# Patient Record
Sex: MALE | Race: WHITE | Employment: UNEMPLOYED | URBAN - NONMETROPOLITAN AREA
[De-identification: names, ages, dates, MRNs, and addresses within clinical notes are randomized per-mention and may not be internally consistent; named-entity substitution may affect disease eponyms.]

---

## 2022-09-09 ENCOUNTER — HOSPITAL ENCOUNTER (EMERGENCY)
Age: 22
Discharge: HOME | End: 2022-09-09
Attending: EMERGENCY MEDICINE
Payer: COMMERCIAL

## 2022-09-09 VITALS
TEMPERATURE: 98.3 F | DIASTOLIC BLOOD PRESSURE: 86 MMHG | OXYGEN SATURATION: 100 % | HEIGHT: 66 IN | BODY MASS INDEX: 20.89 KG/M2 | RESPIRATION RATE: 16 BRPM | SYSTOLIC BLOOD PRESSURE: 144 MMHG | HEART RATE: 98 BPM | WEIGHT: 130 LBS

## 2022-09-09 DIAGNOSIS — R04.0 EPISTAXIS: Primary | ICD-10-CM

## 2022-09-09 PROCEDURE — 99282 EMERGENCY DEPT VISIT SF MDM: CPT

## 2022-09-09 ASSESSMENT — PAIN SCALES - GENERAL: PAINLEVEL_OUTOF10: 0

## 2022-09-09 ASSESSMENT — PAIN - FUNCTIONAL ASSESSMENT: PAIN_FUNCTIONAL_ASSESSMENT: 0-10

## 2022-09-09 NOTE — ED NOTES
Pt AVS and follow up reviewed. Pt expressed understanding and d/c at this time.       Joe Mac RN  09/09/22 8086

## 2022-09-09 NOTE — ED PROVIDER NOTES
CHIEF COMPLAINT  Foreign Body in Nose (Pt states he stuck tissue into his L nostril yesterday for a bleeding nose, pt states he fell asleep and then woke up today and couldn't find the tissue. Pt reports pressure in his nostril. )      HISTORY OF PRESENT ILLNESS  Michael Patricia  is a 23 y.o. male who presents to the ED at via private vehicle complaining of possible foreign body in the left nare. Patient states he had epistaxis yesterday evening so he packed his left nose with tissue paper. When he woke up this morning he was no longer in his nose and he did not notice it around his pillow or bed so he assumed he must have possibly inhaled it during the night. Sent in by his rehab facility for further evaluation although he notes the nurse at the facility did not see anything in his nose. He has no complaints. There are no other complaints, modifying factors or associated symptoms. Nursing notes reviewed. Past medical history:  has no past medical history on file. Past surgical history:  has a past surgical history that includes Tonsillectomy. Home medications:   Prior to Admission medications    Not on File       No Known Allergies    Social history:  reports that he has been smoking cigarettes. He has a 1.00 pack-year smoking history. He has never used smokeless tobacco. He reports that he does not currently use alcohol. He reports current drug use. Drugs: IV, Marijuana (Sylwia Neighbor), Methamphetamines (Crystal Meth), and Opiates . Family history:  History reviewed. No pertinent family history. REVIEW OF SYSTEMS  6 systems reviewed, pertinent positives per HPI otherwise noted to be negative    PHYSICAL EXAM  Vitals:    09/09/22 1314   BP: (!) 144/86   Pulse: 98   Resp: 16   Temp: 98.3 °F (36.8 °C)   SpO2: 100%       GENERAL: Patient is well-developed, well-nourished,  no acute distress. no apparent discomfort. Non toxic appearing. HEENT:  Normocephalic, atraumatic. PERRL.   Conjunctiva 16   Ht 5' 6\" (1.676 m)   Wt 130 lb (59 kg)   SpO2 100%   BMI 20.98 kg/m²                    Emma Dubois MD  09/09/22 6682

## 2022-10-12 ENCOUNTER — HOSPITAL ENCOUNTER (EMERGENCY)
Age: 22
Discharge: HOME | End: 2022-10-12
Attending: EMERGENCY MEDICINE
Payer: MEDICAID

## 2022-10-12 VITALS
RESPIRATION RATE: 16 BRPM | TEMPERATURE: 98.5 F | HEIGHT: 67 IN | SYSTOLIC BLOOD PRESSURE: 130 MMHG | DIASTOLIC BLOOD PRESSURE: 86 MMHG | OXYGEN SATURATION: 100 % | HEART RATE: 82 BPM | BODY MASS INDEX: 21.19 KG/M2 | WEIGHT: 135 LBS

## 2022-10-12 DIAGNOSIS — J06.9 VIRAL URI WITH COUGH: Primary | ICD-10-CM

## 2022-10-12 PROCEDURE — 99282 EMERGENCY DEPT VISIT SF MDM: CPT

## 2022-10-12 ASSESSMENT — PAIN - FUNCTIONAL ASSESSMENT: PAIN_FUNCTIONAL_ASSESSMENT: NONE - DENIES PAIN

## 2022-10-12 NOTE — ED PROVIDER NOTES
[facility]      Pt Name: Blake Oneill  MRN: [id number]  [place] 2000  Date of evaluation: 10/12/2022  Provider: Po Velazquez MD    CHIEF COMPLAINT       Chief Complaint   Patient presents with    Cough     Pt states he has had cough and \"sniffles\" for the last few days. States he was standing in line waiting to see the nurse at [facility] and started to feel lightheaded so they sent him to ER. Patient denies lightheadedness now, states he just feels warm          HISTORY OF PRESENT ILLNESS   (Location/Symptom, Timing/Onset, Context/Setting, Quality, Duration, Modifying Factors, Severity)  Note limiting factors. Blake Oneill is a 23 y.o. male with past medical history of no significant illness though currently in an outpatient drug treatment facility here today for URI symptoms. The patient states for 2 to 3 days he has had mild runny nose and nasal congestion with a slight nonproductive cough. Denies fevers but states \"I sometimes feel warm. No vomiting or diarrhea. States he was in line to see the nurse at the [facility] when he started to feel somewhat lightheaded and was ultimately sent here. Notes those feelings have resolved. No numbness, tingling or weakness at present. No headache. No shortness of breath. Denies sore throat. No known COVID-19 exposures. States he is feeling more less normal at present. \[facility]\""    Nursing Notes were reviewed. REVIEW OF SYSTEMS    (2-9 systems for level 4, 10 or more for level 5)     Review of Systems    Please see HPI for pertinent positive and negative review of system findings. A full 10 system ROS was performed and otherwise negative. PAST MEDICAL HISTORY   History reviewed. No pertinent past medical history.       SURGICAL HISTORY       Past Surgical History:   Procedure Laterality Date    TONSILLECTOMY           CURRENT MEDICATIONS       Previous Medications    No medications on file       ALLERGIES     Patient has no known allergies. FAMILY HISTORY     History reviewed. No pertinent family history. SOCIAL HISTORY       Social History     Socioeconomic History    Marital status: Single     Spouse name: None    Number of children: None    Years of education: None    Highest education level: None   Tobacco Use    Smoking status: Every Day     Packs/day: 0.50     Years: 2.00     Pack years: 1.00     Types: Cigarettes    Smokeless tobacco: Never   Substance and Sexual Activity    Alcohol use: Not Currently    Drug use: Yes     Types: IV, Marijuana (Miki Cosier), Methamphetamines (Crystal Meth), Opiates      Comment: last use 8/2022       SCREENINGS    [place] Coma Scale  Eye Opening: Spontaneous  Best Verbal Response: Oriented  Best Motor Response: Obeys commands  [place] Coma Scale Score: 15          PHYSICAL EXAM    (up to 7 for level 4, 8 or more for level 5)     ED Triage Vitals   BP Temp Temp Source Heart Rate Resp SpO2 Height Weight   10/12/22 1225 10/12/22 1227 10/12/22 1227 10/12/22 1225 10/12/22 1225 10/12/22 1225 10/12/22 1225 10/12/22 1225   130/86 98.5 °F (36.9 °C) Oral 82 16 100 % 5' 7\" (1.702 m) 135 lb (61.2 kg)       Physical Exam      General appearance:  Cooperative. No acute distress. Skin:  Warm. Dry. Ears, nose, mouth and throat:  Oral mucosa moist, posterior oropharynx pink and moist with no exudates. Tongue and uvula midline  Perfusion:  intact  Respiratory: Respirations nonlabored. With clear breath sounds bilaterally and no increased work of breathing  Neurological:  Alert.   Moves all extremities spontaneously  Musculoskeletal:   Normal ROM, no deformities          Psychiatric:  Normal mood      DIAGNOSTIC RESULTS       Labs Reviewed - No data to display    Interpretation per the Radiologist below, if obtained/available at the time of this note:    No orders to display       All other labs/imaging were within normal range or not returned as of

## 2022-10-12 NOTE — ED NOTES
Reviewed patient discharge instructions at this time, copy given to patient. No questions or concerns. Patient voiced understanding.         Frankey Lennert, RN  10/12/22 4477

## 2022-10-25 ENCOUNTER — HOSPITAL ENCOUNTER (EMERGENCY)
Age: 22
Discharge: HOME | End: 2022-10-25
Payer: MEDICAID

## 2022-10-25 VITALS
RESPIRATION RATE: 16 BRPM | WEIGHT: 142 LBS | HEIGHT: 67 IN | HEART RATE: 84 BPM | BODY MASS INDEX: 22.29 KG/M2 | DIASTOLIC BLOOD PRESSURE: 74 MMHG | TEMPERATURE: 98.6 F | OXYGEN SATURATION: 99 % | SYSTOLIC BLOOD PRESSURE: 127 MMHG

## 2022-10-25 DIAGNOSIS — S46.812A TRAPEZIUS MUSCLE STRAIN, LEFT, INITIAL ENCOUNTER: Primary | ICD-10-CM

## 2022-10-25 PROCEDURE — 99283 EMERGENCY DEPT VISIT LOW MDM: CPT

## 2022-10-25 RX ORDER — NAPROXEN 250 MG/1
250 TABLET ORAL 2 TIMES DAILY WITH MEALS
Qty: 60 TABLET | Refills: 0 | Status: SHIPPED | OUTPATIENT
Start: 2022-10-25

## 2022-10-25 RX ORDER — CYCLOBENZAPRINE HCL 5 MG
5 TABLET ORAL 2 TIMES DAILY PRN
Qty: 10 TABLET | Refills: 0 | Status: SHIPPED | OUTPATIENT
Start: 2022-10-25 | End: 2022-11-04

## 2022-10-25 ASSESSMENT — ENCOUNTER SYMPTOMS
SORE THROAT: 0
RHINORRHEA: 0
EYE PAIN: 0
ABDOMINAL PAIN: 0
BLOOD IN STOOL: 0
BACK PAIN: 1
VOMITING: 0
DIARRHEA: 0
SHORTNESS OF BREATH: 0
NAUSEA: 0
COUGH: 0

## 2022-10-25 ASSESSMENT — PAIN - FUNCTIONAL ASSESSMENT: PAIN_FUNCTIONAL_ASSESSMENT: 0-10

## 2022-10-25 ASSESSMENT — PAIN DESCRIPTION - LOCATION: LOCATION: BACK;NECK

## 2022-10-25 NOTE — ED PROVIDER NOTES
[address] [facility]  EMERGENCY DEPARTMENT ENCOUNTER        Pt Name: Luca Christie  MRN: [id number]  [place] 2000  Date of evaluation: 10/25/2022  Provider: JASEN Ny CNP  PCP: No primary care provider on file. Note Started: 5:07 PM EDT      STANISLAW. I have evaluated this patient. My supervising physician was available for consultation. Triage CHIEF COMPLAINT       Chief Complaint   Patient presents with    Back Pain     Left sided back and neck pain. Reports a lot of \"shifting and cracking. \"         HISTORY OF PRESENT ILLNESS   (Location/Symptom, Timing/Onset, Context/Setting, Quality, Duration, Modifying Factors, Severity)  Note limiting factors. Chief Complaint: Left-sided neck and trapezium pain. Luca Christie is a 23 y.o. male who presents to the emergency department symptoms of left-sided neck and trapezium type pain. Patient states that he has pain with palpation to the trapezium muscle. Mild left side of neck is also tender. Patient denies any recent fevers or injury. No recent trauma. No pain within the shoulder blades or thoracic spine. No low back pain. No acute complaints. Otherwise states feeling well. Nursing Notes were all reviewed and agreed with or any disagreements were addressed in the HPI. REVIEW OF SYSTEMS    (2-9 systems for level 4, 10 or more for level 5)     Review of Systems   Constitutional:  Negative for chills, diaphoresis and fever. HENT:  Negative for congestion, ear pain, rhinorrhea and sore throat. Eyes:  Negative for pain and visual disturbance. Respiratory:  Negative for cough and shortness of breath. Cardiovascular:  Negative for chest pain and leg swelling. Gastrointestinal:  Negative for abdominal pain, blood in stool, diarrhea, nausea and vomiting. Genitourinary:  Negative for difficulty urinating, dysuria, flank pain and frequency. Musculoskeletal:  Positive for back pain and neck pain.    Skin: Negative for rash and wound. Neurological:  Negative for dizziness and light-headedness. PAST MEDICAL HISTORY   History reviewed. No pertinent past medical history. SURGICAL HISTORY     Past Surgical History:   Procedure Laterality Date    TONSILLECTOMY         CURRENTMEDICATIONS       Previous Medications    No medications on file       ALLERGIES     Patient has no known allergies. FAMILYHISTORY     History reviewed. No pertinent family history. SOCIAL HISTORY       Social History     Socioeconomic History    Marital status: Single     Spouse name: None    Number of children: None    Years of education: None    Highest education level: None   Tobacco Use    Smoking status: Every Day     Packs/day: 0.50     Years: 2.00     Pack years: 1.00     Types: Cigarettes, Cigars    Smokeless tobacco: Never   Vaping Use    Vaping Use: Never used   Substance and Sexual Activity    Alcohol use: Not Currently    Drug use: Yes     Types: IV, Marijuana (Jacelyn Ros), Methamphetamines (Crystal Meth), Opiates      Comment: last use 8/2022       SCREENINGS    [place] Coma Scale  Eye Opening: Spontaneous  Best Verbal Response: Oriented  Best Motor Response: Obeys commands  [place] Coma Scale Score: 15        PHYSICAL EXAM    (up to 7 for level 4, 8 or more for level 5)     ED Triage Vitals [10/25/22 1359]   BP Temp Temp Source Heart Rate Resp SpO2 Height Weight   127/74 98.6 °F (37 °C) Infrared 84 16 99 % 5' 7\" (1.702 m) 142 lb (64.4 kg)       Physical Exam  Vitals and nursing note reviewed. Constitutional:       Appearance: Normal appearance. He is not toxic-appearing or diaphoretic. HENT:      Head: Normocephalic and atraumatic. Nose: Nose normal.   Eyes:      General:         Right eye: No discharge. Left eye: No discharge. Cardiovascular:      Rate and Rhythm: Normal rate and regular rhythm. Pulses: Normal pulses. Heart sounds: No murmur heard.   Pulmonary:      Effort: Pulmonary effort is normal. No respiratory distress. Breath sounds: No wheezing or rhonchi. Abdominal:      Palpations: Abdomen is soft. Tenderness: There is no abdominal tenderness. There is no guarding or rebound. Musculoskeletal:         General: Normal range of motion. Right shoulder: Normal. Normal range of motion. Normal pulse. Left shoulder: Normal. Normal range of motion. Normal pulse. Right elbow: Normal. Normal range of motion. Left elbow: Normal. Normal range of motion. Cervical back: Normal range of motion and neck supple. Tenderness present. No lacerations, rigidity or torticollis. No pain with movement. Normal range of motion. Thoracic back: Normal.      Lumbar back: Normal.      Right lower leg: No edema. Left lower leg: No edema. Comments: Pain to the trapezium type area on the left trapezium. 2+ radial pulses noted. 5-5 strength in the upper extremities. No symptoms of numbness or tingling. Denies sensory loss. Skin:     General: Skin is warm and dry. Capillary Refill: Capillary refill takes less than 2 seconds. Neurological:      General: No focal deficit present. Mental Status: He is alert and oriented to person, place, and time. Psychiatric:         Mood and Affect: Mood normal.         Behavior: Behavior normal.       DIAGNOSTIC RESULTS   LABS:    Labs Reviewed - No data to display    When ordered, only abnormal lab results are displayed. All other labs were within normal range or not returned as of this dictation. EKG: When ordered, EKG's are interpreted by the Emergency Department Physician in the absence of a cardiologist.  Please see their note for interpretation of EKG.       RADIOLOGY:   Non-plain film images such as CT, Ultrasound and MRI are read by the radiologist. Plain radiographic images are visualized andpreliminarily interpreted by the  ED Provider with the below findings:        Interpretation perthe Radiologist below, if voice recognition program.  Efforts were made to edit the dictations but occasionally words are mis-transcribed.)    JASEN Olivas CNP (electronically signed)             JASEN Olivas CNP  10/25/22 3365

## 2022-10-25 NOTE — ED TRIAGE NOTES
Pt reports he has a hx of back and neck pain. He is concerned about feeling like things are \"shifting\" and would like to get checked out. Pt states a few months ago, when he was actively using drugs, he thinks he cracked his neck and got stuck like that \"for a while. \" Pt denies any loss of control of bowel or bladder.